# Patient Record
Sex: FEMALE | Race: WHITE | Employment: OTHER | URBAN - METROPOLITAN AREA
[De-identification: names, ages, dates, MRNs, and addresses within clinical notes are randomized per-mention and may not be internally consistent; named-entity substitution may affect disease eponyms.]

---

## 2017-01-10 PROBLEM — B02.29 POST HERPETIC NEURALGIA: Status: ACTIVE | Noted: 2017-01-10

## 2017-01-24 ENCOUNTER — HOSPITAL ENCOUNTER (OUTPATIENT)
Dept: MAMMOGRAPHY | Age: 67
Discharge: HOME | End: 2017-01-24
Attending: FAMILY MEDICINE
Payer: MEDICARE

## 2017-01-24 DIAGNOSIS — Z12.31 SCREENING MAMMOGRAM, ENCOUNTER FOR: ICD-10-CM

## 2017-01-24 PROCEDURE — 77067 SCR MAMMO BI INCL CAD: CPT

## 2017-02-10 ENCOUNTER — HOSPITAL ENCOUNTER (OUTPATIENT)
Dept: MAMMOGRAPHY | Age: 67
Discharge: HOME | End: 2017-02-10
Attending: FAMILY MEDICINE
Payer: MEDICARE

## 2017-02-10 DIAGNOSIS — R92.8 ABNORMAL SCREENING MAMMOGRAM: ICD-10-CM

## 2017-02-10 PROCEDURE — 77066 DX MAMMO INCL CAD BI: CPT

## 2017-02-10 PROCEDURE — 76642 ULTRASOUND BREAST LIMITED: CPT

## 2018-02-06 PROBLEM — E03.9 ACQUIRED HYPOTHYROIDISM: Status: ACTIVE | Noted: 2018-02-06

## 2018-02-06 PROBLEM — R03.0 ELEVATED BP WITHOUT DIAGNOSIS OF HYPERTENSION: Status: ACTIVE | Noted: 2017-01-10

## 2018-02-06 PROBLEM — E78.5 HYPERLIPIDEMIA: Status: ACTIVE | Noted: 2018-02-06

## 2018-08-01 ENCOUNTER — HOSPITAL ENCOUNTER (OUTPATIENT)
Dept: GENERAL RADIOLOGY | Age: 68
Discharge: HOME | End: 2018-08-01
Payer: MEDICARE

## 2018-08-01 DIAGNOSIS — M25.551 RIGHT HIP PAIN: ICD-10-CM

## 2018-08-01 PROBLEM — F98.8 ATTENTION DEFICIT DISORDER (ADD) WITHOUT HYPERACTIVITY: Status: ACTIVE | Noted: 2018-08-01

## 2018-08-01 PROCEDURE — 73502 X-RAY EXAM HIP UNI 2-3 VIEWS: CPT

## 2018-08-30 ENCOUNTER — HOSPITAL ENCOUNTER (OUTPATIENT)
Dept: PHYSICAL THERAPY | Age: 68
Discharge: HOME | End: 2018-08-30
Payer: MEDICARE

## 2018-08-30 DIAGNOSIS — M25.551 RIGHT HIP PAIN: ICD-10-CM

## 2018-08-30 PROCEDURE — 97110 THERAPEUTIC EXERCISES: CPT

## 2018-08-30 PROCEDURE — G8978 MOBILITY CURRENT STATUS: HCPCS

## 2018-08-30 PROCEDURE — G8979 MOBILITY GOAL STATUS: HCPCS

## 2018-08-30 PROCEDURE — 97140 MANUAL THERAPY 1/> REGIONS: CPT

## 2018-08-30 PROCEDURE — 97161 PT EVAL LOW COMPLEX 20 MIN: CPT

## 2018-08-30 NOTE — PROGRESS NOTES
Ambulatory/Rehab Services H2 Model Falls Risk Assessment    Risk Factor Pts. ·   Confusion/Disorientation/Impulsivity  []    4 ·   Symptomatic Depression  []   2 ·   Altered Elimination  []   1 ·   Dizziness/Vertigo  []   1 ·   Gender (Male)  []   1 ·   Any administered antiepileptics (anticonvulsants):  []   2 ·   Any administered benzodiazepines:  []   1 ·   Visual Impairment (specify):  []   1 ·   Portable Oxygen Use  []   1 ·   Orthostatic ? BP  []   1 ·   History of Recent Falls (within 3 mos.)  []   5     Ability to Rise from Chair (choose one) Pts. ·   Ability to rise in a single movement  [x]   0 ·   Pushes up, successful in one attempt  []   1 ·   Multiple attempts, but successful  []   3 ·   Unable to rise without assistance  []   4   Total: (5 or greater = High Risk) 0     Falls Prevention Plan:   []                Physical Limitations to Exercise (specify):   []                Mobility Assistance Device (type):   []                Exercise/Equipment Adaptation (specify):    ©2010 [facility] Patent #[id number].  Federal Law prohibits the replication, distribution or use without written permission from [facility]

## 2018-08-30 NOTE — THERAPY EVALUATION
Aleida Harden  : 1950    [place] / [place] PART [place] / Product Type: Medicare /    Linnea Aviles at [address] [place] [address]. [address] [place] [address] [address] [place] [address], [address] [place] [address]  [phone]   [phone]        OUTPATIENT PHYSICAL THERAPY:Initial Assessment 2018  Visit # 1   ICD-10: Treatment Diagnosis:   Pain in right hip (M25.551)  Other bursitis of hip, right hip (M70.71)   Lumbago with sciatica, right side (M54.41)           Precautions/Allergies:   Demerol [meperidine]; Penicillins; Pertussin am; and Valium [diazepam]   Fall Risk Score: 0 (? 5 = High Risk)  MD Orders: Eval and Treat  MEDICAL/REFERRING DIAGNOSIS:  Right hip pain [M25.551]   DATE OF ONSET: 2018. REFERRING PHYSICIAN: Layo Jackson NP  RETURN PHYSICIAN APPOINTMENT: TBD by patient      INITIAL ASSESSMENT:  Delicia Marrero presents to physical therapy with decreased postural and hip/core strength, ROM, joint mobility, flexibility, functional mobility, and increased pain. Significantly decreased R LE external rotation and L LE internal rotation. No pain with lumbar AROM with exception of R SBing. Comparable sign is decreased R ER and pain with prolonged sitting posture. Note: she had no pain at initial evaluation--pain is only at night. --pain is likely muscular in nature. Anterior rotation R innominate present but corrected with MET. Delicia Marrero will benefit from skilled physical therapy (medically necessary) to address above deficits affecting participation in basic ADLs and functional mobility/tolerance. Patient will benefit from manual therapeutic techniques (stretching, joint mobilizations, soft tissue mobilization/myofascial release), therapeutic exercises and activities, postural strengthening/education, and comprehensive home exercises program to address current impairments and functional limitations.        PROBLEM LIST (Impacting functional limitations):  1. Decreased Strength  2. Decreased ADL/Functional Activities  3. Decreased Transfer Abilities  4. Decreased Ambulation Ability/Technique  5. Decreased Balance  6. Increased Pain  7. Decreased Activity Tolerance  8. Increased Fatigue  9. Increased Shortness of Breath  10. Decreased Flexibility/Joint Mobility  11. Decreased [place] with Home Exercise Program INTERVENTIONS PLANNED:  1. Balance Exercise  2. Bed Mobility  3. Cold  4. Cryotherapy  5. Electrical Stimulation  6. Family Education  7. Gait Training  8. Heat  9. Home Exercise Program (HEP)  10. Manual Therapy  11. Neuromuscular Re-education/Strengthening  12. Range of Motion (ROM)  13. Therapeutic Activites  14. Therapeutic Exercise/Strengthening  15. Transfer Training  16. Ultrasound (US)   TREATMENT PLAN:  Effective Dates: 8.30.18 TO 11/28/2018 (90 days). Frequency/Duration: 2 times a week for 90 Days  GOALS: (Goals have been discussed and agreed upon with patient.)  Short Term Goals 4 weeks  1. Ansley Blanco will be independent with HEP for strength and ROM  2. Ansley Blanco will participate in LE strengthening exercises for hip, knee, ankle with weight as appropriate for 3 sets of 10.  3. Ansley Blanco will report <=1/10 pain with negotiating stairs and sit to stand and demonstrate minimal to no difficulty   4. Ansley Blanco will demonstrate improvement of MMT from initial eval to 5/5 B LE in order to return to participate in ADLs with minimal to no difficulty. Radha Broussard will participate in static and dynamic balance activities for 5 minutes to help improve proprioception and decrease risk of falls    6. Aleida Harden to increase lower extremity functional scale by 10 points to show improvement in areas of difficulty  Long Term Goals 8 weeks   1. Ansley Blanco will be independent in HEP of stretching and strengthening   2.  Ansley Blanco will be able to perform sit to stand transfers independently with decreased compensation and no pain   3. Neno Murdock will be able to sleep at night and go to bed with <=2/10 pain and no difficulty. 4. Aleida Harden to increase lower extremity functional scale by 20 points to show improvement in areas of difficulty  Rehabilitation Potential For Stated Goals: Good  Regarding Aleida Harden's therapy, I certify that the treatment plan above will be carried out by a therapist or under their direction. Thank you for this referral,  Enoc Grace DPT     Referring Physician Signature: Carmine Candelario NP              Date                    HISTORY:   History of Present Injury/Illness (Reason for Referral):  Retired -- works out about 30 min 3 days/ week. I garden a lot. I was gardening and reaching over and my R leg was up. ... This was June 2018. I just thought my pain would get better, but then I look like a 78year old woman trying to get up. As long as I'm on my feet I'm fine, ut with sitting down my hip           >Present Symptoms (on day of evaluation)  · Pain; Currently= 8/10  · Best= 0/10  · Worst= 9/10  · Aggravating factors: Sitting too long   · Relieving factors: Standing and walking      Past Medical History/Comorbidities:   Ms. Josefina Pacheco  has a past medical history of Hypercholesterolemia; Neuropathy; Right leg pain (6/15/2015); Shingles outbreak (11/2016); Varicose vein; and Varicose veins of lower extremities with other complications (1/75/3459). She also has no past medical history of Chronic kidney disease or [facility] Ms. Josefina Pacheco  has no past surgical history on file.     Active Ambulatory Problems     Diagnosis Date Noted    Varicose veins of lower extremities with other complications 17/40/4376    Right leg pain 06/15/2015    Post herpetic neuralgia 01/10/2017    Elevated BP without diagnosis of hypertension 01/10/2017    Acquired hypothyroidism 02/06/2018    Hyperlipidemia 02/06/2018     Resolved Ambulatory Problems     Diagnosis Date Noted    No Resolved Ambulatory Problems     Past Medical History:   Diagnosis Date    Hypercholesterolemia     Neuropathy     Right leg pain 6/15/2015    Shingles outbreak 11/2016    Varicose vein     Varicose veins of lower extremities with other complications 7/48/2411     Social History/Living Environment:    Active       Social History     Social History    Marital status:      Spouse name: N/A    Number of children: N/A    Years of education: N/A     Occupational History    Not on file. Social History Main Topics    Smoking status: Never Smoker    Smokeless tobacco: Never Used    Alcohol use No    Drug use: Not on file    Sexual activity: Not on file     Other Topics Concern    Not on file     Social History Narrative     Prior Level of Function/Work/Activity:  *Independent. Active  Dominant Side:  R   Previous Treatment Approach:  PT in the past... This was for psoas mm in L leg. Other Clinical Tests:  Xray for R hip negative. Personal Factors:          Sex:  female        Age:  69 y.o. Current Medications:    Current Outpatient Prescriptions:     meloxicam (MOBIC) 7.5 mg tablet, Take 1 Tab by mouth daily. , Disp: 30 Tab, Rfl: 2    cyanocobalamin 1,000 mcg tablet, Take 1,000 mcg by mouth daily. , Disp: , Rfl:     multivitamin (ONE A DAY) tablet, Take 1 Tab by mouth daily. , Disp: , Rfl:     ascorbic acid (VITAMIN C) 500 mg tablet, Take  by mouth., Disp: , Rfl:     vitamin e (E GEMS) 1,000 unit capsule, Take 1,000 Units by mouth daily. , Disp: , Rfl:     OTHER, Essential oils, Disp: , Rfl:     cholecalciferol (VITAMIN D3) 1,000 unit tablet, Take  by mouth as needed. When sun exposure decreased, Disp: , Rfl:     TURMERIC, BULK,, by Does Not Apply route.  PRN for imflammation, Disp: , Rfl:      Date Last Reviewed:  8/30/2018   Number of Personal Factors/Comorbidities that affect the Plan of Care: 3+: HIGH COMPLEXITY   EXAMINATION:   Observation/Orthostatic Postural Assessment:  No pain with walking. · Gait= Unremakrable  Palpation:  Assessed @ Initial Visit: Tenderness to R PSIS  ROM: Assessed @ Initial Visit:  AROM/PROM         Joint: Eval Date:  8.30.18  Re-Assess Date:  Re-Assess Date:    Active ROM RIGHT LEFT RIGHT LEFT RIGHT LEFT   Knee Extension [facility]       Knee Flexion [facility]       Hip Flexion 80 80       Hip Abduction No issues No issues       Prone Hip Extension w/ Knee bent  Decreased Normal       Hip IR/ER Decreased ER to about 20 degrees. .. IR limitd Better than R, but still limited. R SB *painful but without limitation    Significantly decreased R LE external rotation and L LE internal rotation  Hx of neuralgia down R posterior leg (proximal). Strength:     Eval Date: 8.30.18  Re-Assess Date:  Re-Assess Date:      RIGHT LEFT RIGHT LEFT RIGHT LEFT   Knee Flexion 4+/5 5/5       Knee Extension 4+/5 5/5       Hip Flexion 4/5 5/5       Hip Abduction 4-/5 5/5       Hip Extension 4+/5 5/5                    Special Tests: Assessed @ Initial Visit   · Alf's Test-- Positive  · Scouring Test  -- Positive  · ANGEL LUIS --Negative  · SLR --Negative     Neurological Screen: Patient demonstrates/reports no loss in sensation---ache down leg. No numbness, no pain. Dull ache. Functional Mobility:  Affecting participation in ambulation and standing   · Squat---No issues  · Steps---Slight difficulty with R LE going up stairs. Balance:    Single Leg Stance: R= >30 seconds/ L= >30 seconds     Body Structures Involved:  1. Nerves  2. Bones  3. Joints  4. Muscles  5. Ligaments Body Functions Affected:  1. Sensory/Pain  2. Reproductive  3. Neuromusculoskeletal  4. Movement Related Activities and Participation Affected:  1. Mobility  2.  Self Care   Number of elements that affect the Plan of Care: 4+: HIGH COMPLEXITY   CLINICAL PRESENTATION:   Presentation: Stable and uncomplicated: LOW COMPLEXITY   CLINICAL DECISION MAKING:   Outcome Measure: Tool Used: Lower Extremity Functional Scale (LEFS)  Score:  Initial: 62/80 Most Recent: X/80 (Date: -- )   Interpretation of Score: 20 questions each scored on a 5 point scale with 0 representing \"extreme difficulty or unable to perform\" and 4 representing \"no difficulty\". The lower the score, the greater the functional disability. 80/80 represents no disability. Minimal detectable change is 9 points. Score 80 79-63 62-48 47-32 31-16 15-1 0   Modifier CH CI CJ CK CL CM CN     ? Mobility - Walking and Moving Around:     - CURRENT STATUS: CJ - 20%-39% impaired, limited or restricted    - GOAL STATUS: CI - 1%-19% impaired, limited or restricted    - D/C STATUS:  ---------------To be determined---------------    Medical Necessity:   · Skilled intervention continues to be required due to [employer] having pain with stairs, gardening and overall QOL. *  Reason for Services/Other Comments:  · Patient continues to require skilled intervention due to deficits and impairments seen upon initial evaluation affecting patient's participation in ADLs and functional tasks. Use of outcome tool(s) and clinical judgement create a POC that gives a: Clear prediction of patient's progress: LOW COMPLEXITY   TREATMENT:   (In addition to Assessment/Re-Assessment sessions the following treatments were rendered)    Therapeutic Exercise: (15 minutes):  Exercises per grid below to improve mobility, strength, balance and coordination. Required minimal visual and verbal cues to promote proper body alignment and promote proper body posture. Progressed resistance, range, repetitions and complexity of movement as indicated.      Date:  *8.30.18 Date:   Date:     Activity/Exercise Parameters Parameters Parameters   *Bridges 10     Piriformis 30\"x3     IT Band stretch 30\"x3     LTR 10\"x5                         Manual Therapy Interventions: (10 minutes): Manual interventions performed to improve joint/soft tissue mobility and ROM to improve ability to perform ADLs. Patient responded well to all manual interventions with no significant increase in pain/symptoms. Improved pain reported below. Technique Used Grade  Level # Time(s) Effect while being performed   PA III/IV R PSIS and L4 UPA 4 Improve hypomobility   MET to correct anterior rotation R innminate III   Mobility       Treatment/Session Assessment:  Patient verbalized and demonstrated understanding of HEP. MET improved innominate rotation. Addtionally PAs tender, but no pain after joint mobilizations. · Pain/ Symptoms: Initial:   8*/10 Post Session:  0/10 ·   Compliance with Program/Exercises: Will assess as treatment progresses. · Recommendations/Intent for next treatment session: \"Next visit will focus on advancements to more challenging activities\".       Future Appointments  Date Time Provider Cristiana Chisholm   2/14/2019 10:00 AM Marco Leonardo MD [facility] HTF       Total Treatment Duration:  25 min Rx, 25 eval   PT Patient Time In/Time Out  Time In: 4155  Time Out: Marin Victoria DPT

## 2018-09-06 ENCOUNTER — HOSPITAL ENCOUNTER (OUTPATIENT)
Dept: PHYSICAL THERAPY | Age: 68
End: 2018-09-06
Payer: MEDICARE

## 2018-09-11 ENCOUNTER — HOSPITAL ENCOUNTER (OUTPATIENT)
Dept: PHYSICAL THERAPY | Age: 68
Discharge: HOME | End: 2018-09-11
Payer: MEDICARE

## 2018-09-11 PROCEDURE — 97110 THERAPEUTIC EXERCISES: CPT

## 2018-09-11 PROCEDURE — 97140 MANUAL THERAPY 1/> REGIONS: CPT

## 2018-09-11 NOTE — THERAPY EVALUATION
Aleida Harden  : 1950    [place] / [place] / Product Type: Medicare /    [facility] at [address] [place] [address]. [address] [place] [address] [address] [place] [address]  [phone]   [phone]        OUTPATIENT PHYSICAL THERAPY:Daily Assessment 2018  Visit # 2   ICD-10: Treatment Diagnosis:   Pain in right hip (M25.551)  Other bursitis of hip, right hip (M70.71)   Lumbago with sciatica, right side (M54.41)           Precautions/Allergies:   Demerol [meperidine]; Penicillins; Pertussin am; and Valium [diazepam]   Fall Risk Score: 0 (? 5 = High Risk)  MD Orders: Eval and Treat  MEDICAL/REFERRING DIAGNOSIS:  Right hip pain   DATE OF ONSET: 2018. REFERRING PHYSICIAN: Glenn Chen NP  RETURN PHYSICIAN APPOINTMENT: TBD by patient      INITIAL ASSESSMENT:  Jes Escobedo presents to physical therapy with decreased postural and hip/core strength, ROM, joint mobility, flexibility, functional mobility, and increased pain. Significantly decreased R LE external rotation and L LE internal rotation. No pain with lumbar AROM with exception of R SBing. Comparable sign is decreased R ER and pain with prolonged sitting posture. Note: she had no pain at initial evaluation--pain is only at night. --pain is likely muscular in nature. Anterior rotation R innominate present but corrected with MET. Jes Escobedo will benefit from skilled physical therapy (medically necessary) to address above deficits affecting participation in basic ADLs and functional mobility/tolerance. Patient will benefit from manual therapeutic techniques (stretching, joint mobilizations, soft tissue mobilization/myofascial release), therapeutic exercises and activities, postural strengthening/education, and comprehensive home exercises program to address current impairments and functional limitations.        PROBLEM LIST (Impacting functional limitations):  1. Decreased Strength  2. Decreased ADL/Functional Activities  3. Decreased Transfer Abilities  4. Decreased Ambulation Ability/Technique  5. Decreased Balance  6. Increased Pain  7. Decreased Activity Tolerance  8. Increased Fatigue  9. Increased Shortness of Breath  10. Decreased Flexibility/Joint Mobility  11. Decreased [place] with Home Exercise Program INTERVENTIONS PLANNED:  1. Balance Exercise  2. Bed Mobility  3. Cold  4. Cryotherapy  5. Electrical Stimulation  6. Family Education  7. Gait Training  8. Heat  9. Home Exercise Program (HEP)  10. Manual Therapy  11. Neuromuscular Re-education/Strengthening  12. Range of Motion (ROM)  13. Therapeutic Activites  14. Therapeutic Exercise/Strengthening  15. Transfer Training  16. Ultrasound (US)   TREATMENT PLAN:  Effective Dates: 8.30.18 TO 11/28/2018 (90 days). Frequency/Duration: 2 times a week for 90 Days  GOALS: (Goals have been discussed and agreed upon with patient.)  Short Term Goals 4 weeks  1. Ernestina Cervantes will be independent with HEP for strength and ROM  2. Ernestina Cervantes will participate in LE strengthening exercises for hip, knee, ankle with weight as appropriate for 3 sets of 10.  3. Ernestina Cervantes will report <=1/10 pain with negotiating stairs and sit to stand and demonstrate minimal to no difficulty   4. Ernestina Cervantes will demonstrate improvement of MMT from initial eval to 5/5 B LE in order to return to participate in ADLs with minimal to no difficulty. Radha Broussard will participate in static and dynamic balance activities for 5 minutes to help improve proprioception and decrease risk of falls    6. Aleida Harden to increase lower extremity functional scale by 10 points to show improvement in areas of difficulty  Long Term Goals 8 weeks   1. Ernestina Cervantes will be independent in HEP of stretching and strengthening   2.  Ernestina Hikes will be able to perform sit to stand transfers independently with decreased compensation and no pain   3. Neno Murdock will be able to sleep at night and go to bed with <=2/10 pain and no difficulty. 4. Aleida Harden to increase lower extremity functional scale by 20 points to show improvement in areas of difficulty              HISTORY:   History of Present Injury/Illness (Reason for Referral):  Retired -- works out about 30 min 3 days/ week. I garden a lot. I was gardening and reaching over and my R leg was up. ... This was June 2018. I just thought my pain would get better, but then I look like a 78year old woman trying to get up. As long as I'm on my feet I'm fine, ut with sitting down my hip           >Present Symptoms (on day of evaluation)  · Pain; Currently= 8/10  · Best= 0/10  · Worst= 9/10  · Aggravating factors: Sitting too long   · Relieving factors: Standing and walking      Past Medical History/Comorbidities:   Ms. Carmita Harmon  has a past medical history of Hypercholesterolemia; Neuropathy; Right leg pain (6/15/2015); Shingles outbreak (11/2016); Varicose vein; and Varicose veins of lower extremities with other complications (2/76/4120). She also has no past medical history of Chronic kidney disease or Stroke ([facility] [address] Ms. Carmita Harmon  has no past surgical history on file.     Active Ambulatory Problems     Diagnosis Date Noted    Varicose veins of lower extremities with other complications 47/96/5783    Right leg pain 06/15/2015    Post herpetic neuralgia 01/10/2017    Elevated BP without diagnosis of hypertension 01/10/2017    Acquired hypothyroidism 02/06/2018    Hyperlipidemia 02/06/2018     Resolved Ambulatory Problems     Diagnosis Date Noted    No Resolved Ambulatory Problems     Past Medical History:   Diagnosis Date    Hypercholesterolemia     Neuropathy     Right leg pain 6/15/2015    Shingles outbreak 11/2016    Varicose vein     Varicose veins of lower extremities with other complications 0/66/0909     Social History/Living Environment:    Active       Social History     Social History    Marital status:      Spouse name: N/A    Number of children: N/A    Years of education: N/A     Occupational History    Not on file. Social History Main Topics    Smoking status: Never Smoker    Smokeless tobacco: Never Used    Alcohol use No    Drug use: Not on file    Sexual activity: Not on file     Other Topics Concern    Not on file     Social History Narrative     Prior Level of Function/Work/Activity:  *Independent. Active  Dominant Side:  R   Previous Treatment Approach:  PT in the past... This was for psoas mm in L leg. Other Clinical Tests:  Xray for R hip negative. Personal Factors:          Sex:  female        Age:  69 y.o. Current Medications:    Current Outpatient Prescriptions:     meloxicam (MOBIC) 7.5 mg tablet, Take 1 Tab by mouth daily. , Disp: 30 Tab, Rfl: 2    cyanocobalamin 1,000 mcg tablet, Take 1,000 mcg by mouth daily. , Disp: , Rfl:     multivitamin (ONE A DAY) tablet, Take 1 Tab by mouth daily. , Disp: , Rfl:     ascorbic acid (VITAMIN C) 500 mg tablet, Take  by mouth., Disp: , Rfl:     vitamin e (E GEMS) 1,000 unit capsule, Take 1,000 Units by mouth daily. , Disp: , Rfl:     OTHER, Essential oils, Disp: , Rfl:     cholecalciferol (VITAMIN D3) 1,000 unit tablet, Take  by mouth as needed. When sun exposure decreased, Disp: , Rfl:     TURMERIC, BULK,, by Does Not Apply route. PRN for imflammation, Disp: , Rfl:      Date Last Reviewed:  9/11/2018   EXAMINATION:   Observation/Orthostatic Postural Assessment:  No pain with walking.     · Gait= Unremakrable  Palpation:  Assessed @ Initial Visit: Tenderness to R PSIS  ROM: Assessed @ Initial Visit:  AROM/PROM         Joint: Eval Date:  8.30.18  Re-Assess Date:  Re-Assess Date:    Active ROM RIGHT LEFT RIGHT LEFT RIGHT LEFT   Knee Extension [facility]       Knee Flexion [facility]       Hip Flexion 80 80 Hip Abduction No issues No issues       Prone Hip Extension w/ Knee bent  Decreased Normal       Hip IR/ER Decreased ER to about 20 degrees. .. IR limitd Better than R, but still limited. R SB *painful but without limitation    Significantly decreased R LE external rotation and L LE internal rotation  Hx of neuralgia down R posterior leg (proximal). Strength:     Eval Date: 8.30.18  Re-Assess Date:  Re-Assess Date:      RIGHT LEFT RIGHT LEFT RIGHT LEFT   Knee Flexion 4+/5 5/5       Knee Extension 4+/5 5/5       Hip Flexion 4/5 5/5       Hip Abduction 4-/5 5/5       Hip Extension 4+/5 5/5                    Special Tests: Assessed @ Initial Visit   · Alf's Test-- Positive  · Scouring Test  -- Positive  · ANGEL LUIS --Negative  · SLR --Negative     Neurological Screen: Patient demonstrates/reports no loss in sensation---ache down leg. No numbness, no pain. Dull ache. Functional Mobility:  Affecting participation in ambulation and standing   · Squat---No issues  · Steps---Slight difficulty with R LE going up stairs. Balance:    Single Leg Stance: R= >30 seconds/ L= >30 seconds     CLINICAL PRESENTATION:   CLINICAL DECISION MAKING:   Outcome Measure: Tool Used: Lower Extremity Functional Scale (LEFS)  Score:  Initial: 62/80 Most Recent: X/80 (Date: -- )   Interpretation of Score: 20 questions each scored on a 5 point scale with 0 representing \"extreme difficulty or unable to perform\" and 4 representing \"no difficulty\". The lower the score, the greater the functional disability. 80/80 represents no disability. Minimal detectable change is 9 points. Score 80 79-63 62-48 47-32 31-16 15-1 0   Modifier CH CI CJ CK CL CM CN     ?  Mobility - Walking and Moving Around:     - CURRENT STATUS: CJ - 20%-39% impaired, limited or restricted    - GOAL STATUS: CI - 1%-19% impaired, limited or restricted    - D/C STATUS:  ---------------To be determined---------------    Medical Necessity: · Skilled intervention continues to be required due to Emily Silva having pain with stairs, gardening and overall QOL. *  Reason for Services/Other Comments:  · Patient continues to require skilled intervention due to deficits and impairments seen upon initial evaluation affecting patient's participation in ADLs and functional tasks. TREATMENT:   (In addition to Assessment/Re-Assessment sessions the following treatments were rendered)    Therapeutic Exercise: (25 minutes):  Exercises per grid below to improve mobility, strength, balance and coordination. Required minimal visual and verbal cues to promote proper body alignment and promote proper body posture. Progressed resistance, range, repetitions and complexity of movement as indicated. Date:  *8.30.18 Date:  9.11.18   Date:     Activity/Exercise Parameters Parameters Parameters   *Bridges 10 10x    Piriformis 30\"x3 30\"x3    IT Band stretch 30\"x3 30\"x3    LTR 10\"x5 10\"x5    NuStep  Level 2 5'                  Manual Therapy Interventions: (15 minutes): Manual interventions performed to improve joint/soft tissue mobility and ROM to improve ability to perform ADLs. Patient responded well to all manual interventions with no significant increase in pain/symptoms. Improved pain reported below. Technique Used Grade  Level # Time(s) Effect while being performed   PA III/IV R PSIS and L4 UPA 4 Improve hypomobility   MET to correct anterior rotation R innminate III   Mobility       Treatment/Session Assessment:  Patient verbalized and demonstrated understanding of HEP. Progressed strengthening today--added NuStep. MET improved innominate rotation. Addtionally PAs tender, but no pain after joint mobilizations. Emily Silva has not noticed a lot of leg pain lately after last session. She had her wisdom tooth out Tuesday last week. · Pain/ Symptoms: Initial:   0/10 Post Session:  0/10 ·   Compliance with Program/Exercises:  Will assess as treatment progresses. · Recommendations/Intent for next treatment session: \"Next visit will focus on advancements to more challenging activities\".       Future Appointments  Date Time Provider Cristiana Chisholm   9/13/2018 1:00 PM Figueroa Azul, BERTO [facility]   9/18/2018 1:45 PM Figueroa Conception, BERTO OROURKERPEDGARDO [facility]   10/9/2018 1:00 PM Figueroa Azul, BERTO ZIMMERMAN [facility]   10/11/2018 1:00 PM Figueroa Azul, BERTO OROURKERPEDGARDO [facility]   2/14/2019 10:00 AM Bull Lal MD [facility]       Total Treatment Duration:  40 min Rx*  PT Patient Time In/Time Out  Time In: 1340  Time Out: [address] Elis Heard DPT

## 2018-09-13 ENCOUNTER — HOSPITAL ENCOUNTER (OUTPATIENT)
Dept: PHYSICAL THERAPY | Age: 68
Discharge: HOME | End: 2018-09-13
Payer: MEDICARE

## 2018-09-13 PROCEDURE — 97110 THERAPEUTIC EXERCISES: CPT

## 2018-09-13 PROCEDURE — G8980 MOBILITY D/C STATUS: HCPCS

## 2018-09-13 PROCEDURE — 97140 MANUAL THERAPY 1/> REGIONS: CPT

## 2018-09-13 PROCEDURE — G8979 MOBILITY GOAL STATUS: HCPCS

## 2018-09-13 NOTE — THERAPY DISCHARGE
Aleida Harden  : 1950    [place] / [place] / Product Type: Medicare /    [address] at [address] [facility]. [address] [place] [address], [place], [address] [place] [address]  [phone]   [phone]        OUTPATIENT PHYSICAL THERAPY:Daily and Discharge Assessment 2018  Visit # 3   ICD-10: Treatment Diagnosis:   Pain in right hip (M25.551)  Other bursitis of hip, right hip (M70.71)   Lumbago with sciatica, right side (M54.41)           Precautions/Allergies:   Demerol [meperidine]; Penicillins; Pertussin am; and Valium [diazepam]   Fall Risk Score: 0 (? 5 = High Risk)  MD Orders: Eval and Treat  MEDICAL/REFERRING DIAGNOSIS:  Right hip pain   DATE OF ONSET: 2018. REFERRING PHYSICIAN: Bailee Marinelli NP  RETURN PHYSICIAN APPOINTMENT: TBD by patient    18  Nicolas Caraballo has attended 3 visits including initial evaluation. She has met all goals and scored 80/80 on LEFS demonstrating no issues with ADLs and back to baseline. Thank you for referral.             PROBLEM LIST (Impacting functional limitations):  1. Decreased Strength  2. Decreased ADL/Functional Activities  3. Decreased Transfer Abilities  4. Decreased Ambulation Ability/Technique  5. Decreased Balance  6. Increased Pain  7. Decreased Activity Tolerance  8. Increased Fatigue  9. Increased Shortness of Breath  10. Decreased Flexibility/Joint Mobility  11. Decreased [place] with Home Exercise Program INTERVENTIONS PLANNED:  1. Balance Exercise  2. Bed Mobility  3. Cold  4. Cryotherapy  5. Electrical Stimulation  6. Family Education  7. Gait Training  8. Heat  9. Home Exercise Program (HEP)  10. Manual Therapy  11. Neuromuscular Re-education/Strengthening  12. Range of Motion (ROM)  13. Therapeutic Activites  14. Therapeutic Exercise/Strengthening  15. Transfer Training  16.  Ultrasound (US)   TREATMENT PLAN:  Effective Dates: 18 TO 2018 (90 days). Frequency/Duration: 2 times a week for 90 Days  GOALS: (Goals have been discussed and agreed upon with patient.)  Short Term Goals 4 weeks  1. Betzaida Mares will be independent with HEP for strength and ROM  GOAL MET 9/13/2018   2. Betzaida Mares will participate in LE strengthening exercises for hip, knee, ankle with weight as appropriate for 3 sets of 10. GOAL MET 9/13/2018   3. Betzaida Mares will report <=1/10 pain with negotiating stairs and sit to stand and demonstrate minimal to no difficulty GOAL MET 9/13/2018    4. Betzaida Mares will demonstrate improvement of MMT from initial eval to 5/5 B LE in order to return to participate in ADLs with minimal to no difficulty. GOAL MET 9/13/2018   5. Betzaida Mares will participate in static and dynamic balance activities for 5 minutes to help improve proprioception and decrease risk of falls  GOAL MET 9/13/2018    6. Aleida Harden to increase lower extremity functional scale by 10 points to show improvement in areas of difficulty GOAL MET 9/13/2018   Long Term Goals 8 weeks   1. Betzaida Mares will be independent in HEP of stretching and strengthening GOAL MET 9/13/2018    2. Betzaida Mares will be able to perform sit to stand transfers independently with decreased compensation and no pain GOAL MET 9/13/2018    3. Betzaida Mares will be able to sleep at night and go to bed with <=2/10 pain and no difficulty. GOAL MET 9/13/2018    4. Aleida Harden to increase lower extremity functional scale by 20 points to show improvement in areas of difficulty GOAL MET 9/13/2018                HISTORY:   History of Present Injury/Illness (Reason for Referral):  Retired -- works out about 30 min 3 days/ week. I garden a lot. I was gardening and reaching over and my R leg was up. ... This was June 2018.   I just thought my pain would get better, but then I look like a 78year old woman trying to get up. As long as I'm on my feet I'm fine, ut with sitting down my hip           >Present Symptoms (on day of evaluation)  · Pain; Currently= 8/10  · Best= 0/10  · Worst= 9/10  · Aggravating factors: Sitting too long   · Relieving factors: Standing and walking      Past Medical History/Comorbidities:   Ms. Fermin iGbbs  has a past medical history of Hypercholesterolemia; Neuropathy; Right leg pain (6/15/2015); Shingles outbreak (11/2016); Varicose vein; and Varicose veins of lower extremities with other complications (0/46/4348). She also has no past medical history of Chronic kidney disease or Stroke ([facility] [address] Ms. Fermin Gibbs  has no past surgical history on file. Active Ambulatory Problems     Diagnosis Date Noted    Varicose veins of lower extremities with other complications 72/77/9758    Right leg pain 06/15/2015    Post herpetic neuralgia 01/10/2017    Elevated BP without diagnosis of hypertension 01/10/2017    Acquired hypothyroidism 02/06/2018    Hyperlipidemia 02/06/2018     Resolved Ambulatory Problems     Diagnosis Date Noted    No Resolved Ambulatory Problems     Past Medical History:   Diagnosis Date    Hypercholesterolemia     Neuropathy     Right leg pain 6/15/2015    Shingles outbreak 11/2016    Varicose vein     Varicose veins of lower extremities with other complications 5/20/0909     Social History/Living Environment:    Active       Social History     Social History    Marital status:      Spouse name: N/A    Number of children: N/A    Years of education: N/A     Occupational History    Not on file. Social History Main Topics    Smoking status: Never Smoker    Smokeless tobacco: Never Used    Alcohol use No    Drug use: Not on file    Sexual activity: Not on file     Other Topics Concern    Not on file     Social History Narrative     Prior Level of Function/Work/Activity:  *Independent. Active  Dominant Side:  R   Previous Treatment Approach:  PT in the past... This was for psoas mm in L leg. Other Clinical Tests:  Xray for R hip negative. Personal Factors:          Sex:  female        Age:  69 y.o. Current Medications:    Current Outpatient Prescriptions:     meloxicam (MOBIC) 7.5 mg tablet, Take 1 Tab by mouth daily. , Disp: 30 Tab, Rfl: 2    cyanocobalamin 1,000 mcg tablet, Take 1,000 mcg by mouth daily. , Disp: , Rfl:     multivitamin (ONE A DAY) tablet, Take 1 Tab by mouth daily. , Disp: , Rfl:     ascorbic acid (VITAMIN C) 500 mg tablet, Take  by mouth., Disp: , Rfl:     vitamin e (E GEMS) 1,000 unit capsule, Take 1,000 Units by mouth daily. , Disp: , Rfl:     OTHER, Essential oils, Disp: , Rfl:     cholecalciferol (VITAMIN D3) 1,000 unit tablet, Take  by mouth as needed. When sun exposure decreased, Disp: , Rfl:     TURMERIC, BULK,, by Does Not Apply route. PRN for imflammation, Disp: , Rfl:      Date Last Reviewed:  9/13/2018   EXAMINATION:   Observation/Orthostatic Postural Assessment:  No pain with walking. · Gait= Unremakrable  Palpation:  Assessed @ Initial Visit: Tenderness to R PSIS  ROM: Assessed @ Initial Visit:  AROM/PROM         Joint: Eval Date:  8.30.18  Re-Assess Date:  Re-Assess Date:    Active ROM RIGHT LEFT RIGHT LEFT RIGHT LEFT   Knee Extension [facility]     Knee Flexion [facility] WFL     Hip Flexion 80 80 80 80     Hip Abduction No issues No issues No issues No issues     Prone Hip Extension w/ Knee bent  Decreased Normal Decreased  Normal     Hip IR/ER Decreased ER to about 20 degrees. .. IR limitd Better than R, but still limited. Decreased  Better     R SB *painful but without limitation    Significantly decreased R LE external rotation and L LE internal rotation  Hx of neuralgia down R posterior leg (proximal).       Strength:     Eval Date: 8.30.18  Re-Assess Date: 9/13/18  Re-Assess Date:      RIGHT LEFT RIGHT LEFT RIGHT LEFT   Knee Flexion 4+/5 5/5 5/5 5/5     Knee Extension 4+/5 5/5 5/5 5/5     Hip Flexion 4/5 5/5 5/5 5/5     Hip Abduction 4-/5 5/5 5/5 5/5     Hip Extension 4+/5 5/5 5/5 5/5                  Special Tests: Assessed @ Initial Visit   · Alf's Test-- Positive  · Scouring Test  -- Positive  · ANGEL LUIS --Negative  · SLR --Negative     Neurological Screen: Patient demonstrates/reports no loss in sensation---ache down leg. No numbness, no pain. Dull ache. Functional Mobility:  Affecting participation in ambulation and standing   · Squat---No issues  · Steps---Slight difficulty with R LE going up stairs. Balance:    Single Leg Stance: R= >30 seconds/ L= >30 seconds     CLINICAL PRESENTATION:   CLINICAL DECISION MAKING:   Outcome Measure: Tool Used: Lower Extremity Functional Scale (LEFS)  Score:  Initial: 62/80 Most Recent: 80/80 (Date: 9/13/18 )   Interpretation of Score: 20 questions each scored on a 5 point scale with 0 representing \"extreme difficulty or unable to perform\" and 4 representing \"no difficulty\". The lower the score, the greater the functional disability. 80/80 represents no disability. Minimal detectable change is 9 points. Score 80 79-63 62-48 47-32 31-16 15-1 0   Modifier CH CI CJ CK CL CM CN     ? Mobility - Walking and Moving Around:     - CURRENT STATUS: CJ - 20%-39% impaired, limited or restricted    - GOAL STATUS: CI - 1%-19% impaired, limited or restricted    - D/C STATUS:  CH - 0% impaired, limited or restricted  ·    TREATMENT:   (In addition to Assessment/Re-Assessment sessions the following treatments were rendered)    Therapeutic Exercise: (30 minutes):  Exercises per grid below to improve mobility, strength, balance and coordination. Required minimal visual and verbal cues to promote proper body alignment and promote proper body posture. Progressed resistance, range, repetitions and complexity of movement as indicated.      Date:  *8.30.18 Date:  9.11.18   Date:  9/13/18     Activity/Exercise Parameters Parameters Parameters   *Bridges 10 10x 10x   Piriformis 30\"x3 30\"x3 30\"x3   IT Band stretch 30\"x3 30\"x3 30\"x3   LTR 10\"x5 10\"x5 10\"x5   NuStep  Level 2 5' Level 2 5'                  Manual Therapy Interventions: (10 minutes): Manual interventions performed to improve joint/soft tissue mobility and ROM to improve ability to perform ADLs. Patient responded well to all manual interventions with no significant increase in pain/symptoms. Improved pain reported below. Technique Used Grade  Level # Time(s) Effect while being performed   PA III/IV R PSIS and L4 UPA 4 Improve hypomobility   MET to correct anterior rotation R innminate III   Mobility       Treatment/Session Assessment:  Aleida DAVIDSON Manishzaheerjonathan has met all goals. Will DC this session.         · Pain/ Symptoms: Initial:   0/10 Post Session:  0/10       Future Appointments  Date Time Provider Cristiana Kathrine   9/18/2018 1:45 PM Ron Fleming DPT [facility]   2/14/2019 10:00 AM Laverne Hernandez MD [facility]       Total Treatment Duration:  40 min Rx*  PT Patient Time In/Time Out  Time In: 1300  Time Out: [address] Giovanni Peña DPT

## 2018-09-18 ENCOUNTER — APPOINTMENT (OUTPATIENT)
Dept: PHYSICAL THERAPY | Age: 68
End: 2018-09-18
Payer: MEDICARE

## 2018-11-05 ENCOUNTER — HOSPITAL ENCOUNTER (OUTPATIENT)
Dept: GENERAL RADIOLOGY | Age: 68
Discharge: HOME | End: 2018-11-05
Payer: MEDICARE

## 2018-11-05 DIAGNOSIS — R07.81 RIB PAIN ON LEFT SIDE: ICD-10-CM

## 2018-11-05 PROCEDURE — 71100 X-RAY EXAM RIBS UNI 2 VIEWS: CPT

## 2018-11-05 PROCEDURE — 71046 X-RAY EXAM CHEST 2 VIEWS: CPT

## 2019-05-14 ENCOUNTER — HOSPITAL ENCOUNTER (OUTPATIENT)
Dept: MAMMOGRAPHY | Age: 69
Discharge: HOME | End: 2019-05-14
Attending: FAMILY MEDICINE
Payer: MEDICARE

## 2019-05-14 DIAGNOSIS — Z12.39 SCREENING BREAST EXAMINATION: ICD-10-CM

## 2019-05-14 DIAGNOSIS — E28.39 ESTROGEN DEFICIENCY: ICD-10-CM

## 2019-05-14 PROCEDURE — 77080 DXA BONE DENSITY AXIAL: CPT

## 2019-05-14 PROCEDURE — 77067 SCR MAMMO BI INCL CAD: CPT

## 2022-03-19 PROBLEM — B02.29 POST HERPETIC NEURALGIA: Status: ACTIVE | Noted: 2017-01-10

## 2022-03-19 PROBLEM — E03.9 ACQUIRED HYPOTHYROIDISM: Status: ACTIVE | Noted: 2018-02-06

## 2022-03-19 PROBLEM — R03.0 ELEVATED BP WITHOUT DIAGNOSIS OF HYPERTENSION: Status: ACTIVE | Noted: 2017-01-10

## 2022-03-20 PROBLEM — E78.5 HYPERLIPIDEMIA: Status: ACTIVE | Noted: 2018-02-06

## 2024-08-04 ASSESSMENT — PATIENT HEALTH QUESTIONNAIRE - PHQ9
SUM OF ALL RESPONSES TO PHQ QUESTIONS 1-9: 0
SUM OF ALL RESPONSES TO PHQ9 QUESTIONS 1 & 2: 0
SUM OF ALL RESPONSES TO PHQ9 QUESTIONS 1 & 2: 0
1. LITTLE INTEREST OR PLEASURE IN DOING THINGS: NOT AT ALL
1. LITTLE INTEREST OR PLEASURE IN DOING THINGS: NOT AT ALL
2. FEELING DOWN, DEPRESSED OR HOPELESS: NOT AT ALL
SUM OF ALL RESPONSES TO PHQ QUESTIONS 1-9: 0
2. FEELING DOWN, DEPRESSED OR HOPELESS: NOT AT ALL
SUM OF ALL RESPONSES TO PHQ QUESTIONS 1-9: 0
SUM OF ALL RESPONSES TO PHQ QUESTIONS 1-9: 0

## 2024-08-07 ENCOUNTER — OFFICE VISIT (OUTPATIENT)
Dept: FAMILY MEDICINE CLINIC | Facility: CLINIC | Age: 74
End: 2024-08-07
Payer: COMMERCIAL

## 2024-08-07 VITALS
DIASTOLIC BLOOD PRESSURE: 74 MMHG | SYSTOLIC BLOOD PRESSURE: 128 MMHG | HEART RATE: 56 BPM | HEIGHT: 64 IN | WEIGHT: 175.4 LBS | OXYGEN SATURATION: 97 % | TEMPERATURE: 97.3 F | BODY MASS INDEX: 29.94 KG/M2

## 2024-08-07 DIAGNOSIS — N81.10 VAGINAL PROLAPSE: ICD-10-CM

## 2024-08-07 DIAGNOSIS — B35.9 TINEA: ICD-10-CM

## 2024-08-07 DIAGNOSIS — E03.9 ACQUIRED HYPOTHYROIDISM: Primary | ICD-10-CM

## 2024-08-07 DIAGNOSIS — E78.2 MIXED HYPERLIPIDEMIA: ICD-10-CM

## 2024-08-07 LAB
ALBUMIN SERPL-MCNC: 3.8 G/DL (ref 3.2–4.6)
ALBUMIN/GLOB SERPL: 1.3 (ref 1–1.9)
ALP SERPL-CCNC: 88 U/L (ref 35–104)
ALT SERPL-CCNC: 19 U/L (ref 12–65)
APPEARANCE UR: CLEAR
AST SERPL-CCNC: 25 U/L (ref 15–37)
BACTERIA URNS QL MICRO: NEGATIVE /HPF
BASOPHILS # BLD: 0 K/UL (ref 0–0.2)
BASOPHILS NFR BLD: 0 % (ref 0–2)
BILIRUB SERPL-MCNC: 0.4 MG/DL (ref 0–1.2)
BILIRUB UR QL: NEGATIVE
BUN SERPL-MCNC: 18 MG/DL (ref 8–23)
CALCIUM SERPL-MCNC: 9.3 MG/DL (ref 8.8–10.2)
CHLORIDE SERPL-SCNC: 102 MMOL/L (ref 98–107)
CHOLEST SERPL-MCNC: 260 MG/DL (ref 0–200)
CO2 SERPL-SCNC: 29 MMOL/L (ref 20–28)
COLOR UR: NORMAL
CREAT SERPL-MCNC: 0.78 MG/DL (ref 0.6–1.1)
DIFFERENTIAL METHOD BLD: ABNORMAL
EOSINOPHIL # BLD: 0.1 K/UL (ref 0–0.8)
EOSINOPHIL NFR BLD: 2 % (ref 0.5–7.8)
EPI CELLS #/AREA URNS HPF: NORMAL /HPF (ref 0–5)
ERYTHROCYTE [DISTWIDTH] IN BLOOD BY AUTOMATED COUNT: 13.3 % (ref 11.9–14.6)
GLOBULIN SER CALC-MCNC: 3 G/DL (ref 2.3–3.5)
GLUCOSE SERPL-MCNC: 87 MG/DL (ref 70–99)
GLUCOSE UR STRIP.AUTO-MCNC: NEGATIVE MG/DL
HCT VFR BLD AUTO: 44.4 % (ref 35.8–46.3)
HDLC SERPL-MCNC: 65 MG/DL (ref 40–60)
HDLC SERPL: 4 (ref 0–5)
HGB BLD-MCNC: 13.5 G/DL (ref 11.7–15.4)
HGB UR QL STRIP: NEGATIVE
HYALINE CASTS URNS QL MICRO: NORMAL /LPF
IMM GRANULOCYTES # BLD AUTO: 0 K/UL (ref 0–0.5)
IMM GRANULOCYTES NFR BLD AUTO: 1 % (ref 0–5)
KETONES UR QL STRIP.AUTO: NEGATIVE MG/DL
LDLC SERPL CALC-MCNC: 182 MG/DL (ref 0–100)
LEUKOCYTE ESTERASE UR QL STRIP.AUTO: NEGATIVE
LYMPHOCYTES # BLD: 1.8 K/UL (ref 0.5–4.6)
LYMPHOCYTES NFR BLD: 33 % (ref 13–44)
MCH RBC QN AUTO: 29 PG (ref 26.1–32.9)
MCHC RBC AUTO-ENTMCNC: 30.4 G/DL (ref 31.4–35)
MCV RBC AUTO: 95.5 FL (ref 82–102)
MONOCYTES # BLD: 0.6 K/UL (ref 0.1–1.3)
MONOCYTES NFR BLD: 11 % (ref 4–12)
MUCOUS THREADS URNS QL MICRO: 0 /LPF
NEUTS SEG # BLD: 3 K/UL (ref 1.7–8.2)
NEUTS SEG NFR BLD: 54 % (ref 43–78)
NITRITE UR QL STRIP.AUTO: NEGATIVE
NRBC # BLD: 0 K/UL (ref 0–0.2)
PH UR STRIP: 5.5 (ref 5–9)
PLATELET # BLD AUTO: 276 K/UL (ref 150–450)
PMV BLD AUTO: 10.4 FL (ref 9.4–12.3)
POTASSIUM SERPL-SCNC: 4.5 MMOL/L (ref 3.5–5.1)
PROT SERPL-MCNC: 6.8 G/DL (ref 6.3–8.2)
PROT UR STRIP-MCNC: NEGATIVE MG/DL
RBC # BLD AUTO: 4.65 M/UL (ref 4.05–5.2)
RBC #/AREA URNS HPF: NORMAL /HPF (ref 0–5)
SODIUM SERPL-SCNC: 142 MMOL/L (ref 136–145)
SP GR UR REFRACTOMETRY: 1.02 (ref 1–1.02)
TRIGL SERPL-MCNC: 64 MG/DL (ref 0–150)
TSH W FREE THYROID IF ABNORMAL: 3.58 UIU/ML (ref 0.27–4.2)
URINE CULTURE IF INDICATED: NORMAL
UROBILINOGEN UR QL STRIP.AUTO: 0.2 EU/DL (ref 0.2–1)
VLDLC SERPL CALC-MCNC: 13 MG/DL (ref 6–23)
WBC # BLD AUTO: 5.6 K/UL (ref 4.3–11.1)
WBC URNS QL MICRO: NORMAL /HPF (ref 0–4)

## 2024-08-07 PROCEDURE — 1123F ACP DISCUSS/DSCN MKR DOCD: CPT | Performed by: FAMILY MEDICINE

## 2024-08-07 PROCEDURE — 99214 OFFICE O/P EST MOD 30 MIN: CPT | Performed by: FAMILY MEDICINE

## 2024-08-07 RX ORDER — CLOTRIMAZOLE AND BETAMETHASONE DIPROPIONATE 10; .64 MG/G; MG/G
CREAM TOPICAL
Qty: 45 G | Refills: 2 | Status: SHIPPED | OUTPATIENT
Start: 2024-08-07

## 2024-08-07 SDOH — ECONOMIC STABILITY: FOOD INSECURITY: WITHIN THE PAST 12 MONTHS, YOU WORRIED THAT YOUR FOOD WOULD RUN OUT BEFORE YOU GOT MONEY TO BUY MORE.: NEVER TRUE

## 2024-08-07 SDOH — ECONOMIC STABILITY: HOUSING INSECURITY
IN THE LAST 12 MONTHS, WAS THERE A TIME WHEN YOU DID NOT HAVE A STEADY PLACE TO SLEEP OR SLEPT IN A SHELTER (INCLUDING NOW)?: NO

## 2024-08-07 SDOH — ECONOMIC STABILITY: FOOD INSECURITY: WITHIN THE PAST 12 MONTHS, THE FOOD YOU BOUGHT JUST DIDN'T LAST AND YOU DIDN'T HAVE MONEY TO GET MORE.: NEVER TRUE

## 2024-08-07 SDOH — ECONOMIC STABILITY: INCOME INSECURITY: HOW HARD IS IT FOR YOU TO PAY FOR THE VERY BASICS LIKE FOOD, HOUSING, MEDICAL CARE, AND HEATING?: NOT HARD AT ALL

## 2024-08-07 ASSESSMENT — ENCOUNTER SYMPTOMS
SINUS PAIN: 0
DIARRHEA: 0
CHEST TIGHTNESS: 0
SHORTNESS OF BREATH: 0
EYE DISCHARGE: 0
CONSTIPATION: 0
COUGH: 0
ABDOMINAL PAIN: 0

## 2024-08-07 NOTE — PROGRESS NOTES
Anastasia Ivey is a 73 y.o. female who presents with   Chief Complaint   Patient presents with    Follow-up     Recheck prolapse    Lab Collection       History of Present Illness  Pt with vaginal prolapse here for recheck.  Feels some bulging.  Improved with pelvic floor exercises.  Sinus drainage chronically.  Clear.  Hx hypothyroidism, but normal TSH on last check without meds.  Trouble losing weight.  Heart racing in past.  Not recently.  No increased fatigue.     Review of Systems  Review of Systems   Constitutional:  Negative for appetite change, fatigue and fever.   HENT:  Negative for congestion, ear pain and sinus pain.    Eyes:  Negative for discharge.   Respiratory:  Negative for cough, chest tightness and shortness of breath.    Cardiovascular:  Negative for chest pain, palpitations and leg swelling.   Gastrointestinal:  Negative for abdominal pain, constipation and diarrhea.   Genitourinary:  Negative for dysuria.   Musculoskeletal:  Negative for joint swelling.   Skin:  Negative for rash.   Neurological:  Negative for headaches.   Hematological:  Negative for adenopathy.   Psychiatric/Behavioral:  Negative for dysphoric mood. The patient is not nervous/anxious.         Medications  Current Outpatient Medications   Medication Sig Dispense Refill    clotrimazole-betamethasone (LOTRISONE) 1-0.05 % cream Apply topically 2 times daily. 45 g 2     No current facility-administered medications for this visit.        Past Medical History  Past Medical History:   Diagnosis Date    Hypercholesterolemia     Neuropathy     Right leg pain 6/15/2015    Shingles outbreak 11/2016    Varicose vein     Varicose veins of lower extremities with other complications 6/15/2015       Surgical History  No past surgical history on file.       Family History  Family History   Problem Relation Age of Onset    Cancer Brother     No Known Problems Brother     Other Maternal Aunt         vv,spider    Other Mother